# Patient Record
Sex: MALE | Race: WHITE | HISPANIC OR LATINO | Employment: UNEMPLOYED | URBAN - NONMETROPOLITAN AREA
[De-identification: names, ages, dates, MRNs, and addresses within clinical notes are randomized per-mention and may not be internally consistent; named-entity substitution may affect disease eponyms.]

---

## 2024-06-23 ENCOUNTER — HOSPITAL ENCOUNTER (EMERGENCY)
Facility: HOSPITAL | Age: 54
Discharge: HOME | End: 2024-06-23
Attending: EMERGENCY MEDICINE
Payer: COMMERCIAL

## 2024-06-23 ENCOUNTER — APPOINTMENT (OUTPATIENT)
Dept: CARDIOLOGY | Facility: HOSPITAL | Age: 54
End: 2024-06-23
Payer: COMMERCIAL

## 2024-06-23 VITALS
HEIGHT: 67 IN | OXYGEN SATURATION: 96 % | SYSTOLIC BLOOD PRESSURE: 133 MMHG | WEIGHT: 175 LBS | RESPIRATION RATE: 18 BRPM | HEART RATE: 119 BPM | BODY MASS INDEX: 27.47 KG/M2 | TEMPERATURE: 97.3 F | DIASTOLIC BLOOD PRESSURE: 80 MMHG

## 2024-06-23 DIAGNOSIS — Z13.9 ENCOUNTER FOR MEDICAL SCREENING EXAMINATION: ICD-10-CM

## 2024-06-23 DIAGNOSIS — F41.9 ANXIOUSNESS: ICD-10-CM

## 2024-06-23 DIAGNOSIS — F15.10 METHAMPHETAMINE ABUSE (MULTI): Primary | ICD-10-CM

## 2024-06-23 DIAGNOSIS — G47.00 INSOMNIA, UNSPECIFIED TYPE: ICD-10-CM

## 2024-06-23 PROCEDURE — 99283 EMERGENCY DEPT VISIT LOW MDM: CPT

## 2024-06-23 PROCEDURE — 93005 ELECTROCARDIOGRAM TRACING: CPT

## 2024-06-23 RX ORDER — HYDROXYZINE HYDROCHLORIDE 25 MG/1
25 TABLET, FILM COATED ORAL EVERY 6 HOURS PRN
Qty: 20 TABLET | Refills: 0 | Status: SHIPPED | OUTPATIENT
Start: 2024-06-23

## 2024-06-23 SDOH — HEALTH STABILITY: MENTAL HEALTH: BEHAVIORS/MOOD: ANXIOUS

## 2024-06-23 ASSESSMENT — PAIN DESCRIPTION - LOCATION: LOCATION: CHEST

## 2024-06-23 ASSESSMENT — PAIN - FUNCTIONAL ASSESSMENT
PAIN_FUNCTIONAL_ASSESSMENT: 0-10
PAIN_FUNCTIONAL_ASSESSMENT: 0-10

## 2024-06-23 ASSESSMENT — PAIN SCALES - GENERAL
PAINLEVEL_OUTOF10: 0 - NO PAIN
PAINLEVEL_OUTOF10: 7

## 2024-06-23 ASSESSMENT — PAIN DESCRIPTION - PROGRESSION: CLINICAL_PROGRESSION: NOT CHANGED

## 2024-06-23 ASSESSMENT — PAIN DESCRIPTION - DESCRIPTORS: DESCRIPTORS: ACHING

## 2024-06-23 ASSESSMENT — PAIN DESCRIPTION - PAIN TYPE: TYPE: ACUTE PAIN

## 2024-06-23 NOTE — ED PROVIDER NOTES
"HPI   Chief Complaint   Patient presents with    Addiction Problem     Ongoing symptoms since last taking meth 4 days ago. Shakiness, \"bumpy\" tongue, \"weird\" feeling in chest       53-year-old male with history of methamphetamine abuse presents for evaluation of methamphetamine abuse, insomnia, anxiousness, bumpiness in the tongue, \"weird\" feeling in the chest, and a sensation of needing oxygen.  Patient states \"I just need to get some oxygen.  Can you please give me oxygen?\"  Patient states that he used methamphetamine 4 days ago.  He states he has had a longstanding issue with methamphetamine.  He states he has been awake most of the time for the past 4 days.  He states that he wants to lay down and sleep.  He denies homicidal or suicidal ideation.  Denies hallucinations.  He is staying with a friend within walking distance name Jag.  He feels safe at home.  He is asking for something to help with his anxiousness and insomnia.  He is asking for resources for treatment for methamphetamine abuse.                            No data recorded                   Patient History   History reviewed. No pertinent past medical history.  History reviewed. No pertinent surgical history.  No family history on file.  Social History     Tobacco Use    Smoking status: Unknown    Smokeless tobacco: Not on file   Substance Use Topics    Alcohol use: Not on file    Drug use: Yes     Types: Methamphetamines     Comment: last use 4 days ago       Physical Exam   ED Triage Vitals [06/23/24 0819]   Temperature Heart Rate Respirations BP   36.3 °C (97.3 °F) (!) 119 18 133/80      SpO2 Temp Source Heart Rate Source Patient Position   96 % Temporal -- --      BP Location FiO2 (%)     -- --       Physical Exam  Vitals and nursing note reviewed.   Constitutional:       General: He is not in acute distress.     Appearance: He is well-developed.      Comments: Unkempt.   HENT:      Head: Normocephalic and atraumatic.   Eyes:      " Conjunctiva/sclera: Conjunctivae normal.   Cardiovascular:      Rate and Rhythm: Normal rate and regular rhythm.      Heart sounds: No murmur heard.  Pulmonary:      Effort: Pulmonary effort is normal. No respiratory distress.      Breath sounds: Normal breath sounds.   Abdominal:      Palpations: Abdomen is soft.      Tenderness: There is no abdominal tenderness.   Musculoskeletal:         General: No swelling.      Cervical back: Neck supple.   Skin:     General: Skin is warm and dry.      Capillary Refill: Capillary refill takes less than 2 seconds.   Neurological:      General: No focal deficit present.      Mental Status: He is alert and oriented to person, place, and time.      Cranial Nerves: No cranial nerve deficit.      Sensory: No sensory deficit.      Motor: No weakness.      Coordination: Coordination normal.   Psychiatric:         Attention and Perception: He does not perceive auditory or visual hallucinations.         Mood and Affect: Mood is anxious.         Speech: Speech normal.         Thought Content: Thought content does not include homicidal or suicidal ideation.         Judgment: Judgment normal.         ED Course & MDM   ED Course as of 06/23/24 0841   Sun Jun 23, 2024   0834 ECG 12 lead  EKG shows sinus tachycardia with right axis deviation.  Nonspecific ST-T changes.  No acute injury pattern.  No prior EKGs available for comparison. [BT]      ED Course User Index  [BT] Akira Mancilla DO         Diagnoses as of 06/23/24 0841   Methamphetamine abuse (Multi)   Encounter for medical screening examination   Anxiousness   Insomnia, unspecified type       Medical Decision Making    53-year-old male presents with methamphetamine abuse, anxiousness, insomnia.  He was complaining of some vague weird sensation in his chest without chest pain.  He also wishes to have enough oxygen.    His EKG shows sinus tachycardia with right axis deviation.  No acute injury pattern.  Doubt acute coronary  syndrome.    He is also complaining of some numbness in his tongue, likely secondary to smoking methamphetamine.    He is also complaining of insomnia and anxiousness, likely secondary to abusing methamphetamine.    Medical screening exam performed.  No emergency medical condition identified at this time.    He was referred to [facility] for outpatient treatment resources for methamphetamine abuse.  He was given a prescription for Atarax for anxiety and insomnia.    He was encouraged to return in 12 hours or sooner with any concerns.  He is agreeable with this plan and verbalized understanding.  Discharged home.    Procedure  Procedures     Akira Mancilla,   06/23/24 0803

## 2024-06-23 NOTE — ED TRIAGE NOTES
"Pt to ED c/o adverse reaction from using meth 4 days ago. Pt states he used approximately 4 days ago. Pt states he has pain in his tongue and chest, 8/10. Pt states it is a \"weird\" feeling. Pt also complains of shakiness. Pt is concerned that symptoms are ongoing 4 days after using.  "

## 2024-06-25 LAB
ATRIAL RATE: 118 BPM
P AXIS: 82 DEGREES
P OFFSET: 201 MS
P ONSET: 150 MS
PR INTERVAL: 130 MS
Q ONSET: 215 MS
QRS COUNT: 19 BEATS
QRS DURATION: 82 MS
QT INTERVAL: 362 MS
QTC CALCULATION(BAZETT): 507 MS
QTC FREDERICIA: 453 MS
R AXIS: 96 DEGREES
T AXIS: 49 DEGREES
T OFFSET: 396 MS
VENTRICULAR RATE: 118 BPM

## 2025-04-09 ENCOUNTER — HOSPITAL ENCOUNTER (EMERGENCY)
Facility: HOSPITAL | Age: 55
Discharge: HOME | End: 2025-04-09
Attending: EMERGENCY MEDICINE
Payer: COMMERCIAL

## 2025-04-09 VITALS
RESPIRATION RATE: 19 BRPM | WEIGHT: 210 LBS | DIASTOLIC BLOOD PRESSURE: 64 MMHG | HEART RATE: 95 BPM | TEMPERATURE: 97.7 F | HEIGHT: 67 IN | BODY MASS INDEX: 32.96 KG/M2 | SYSTOLIC BLOOD PRESSURE: 148 MMHG | OXYGEN SATURATION: 98 %

## 2025-04-09 DIAGNOSIS — M54.31 SCIATICA OF RIGHT SIDE: Primary | ICD-10-CM

## 2025-04-09 PROCEDURE — 2500000001 HC RX 250 WO HCPCS SELF ADMINISTERED DRUGS (ALT 637 FOR MEDICARE OP): Mod: SE

## 2025-04-09 PROCEDURE — 99283 EMERGENCY DEPT VISIT LOW MDM: CPT | Performed by: EMERGENCY MEDICINE

## 2025-04-09 RX ORDER — IBUPROFEN 600 MG/1
600 TABLET ORAL EVERY 6 HOURS PRN
Qty: 28 TABLET | Refills: 0 | Status: SHIPPED | OUTPATIENT
Start: 2025-04-09 | End: 2025-04-16

## 2025-04-09 RX ORDER — PREDNISONE 10 MG/1
20 TABLET ORAL DAILY
Qty: 14 TABLET | Refills: 0 | Status: SHIPPED | OUTPATIENT
Start: 2025-04-09 | End: 2025-04-16

## 2025-04-09 RX ORDER — IBUPROFEN 600 MG/1
600 TABLET ORAL ONCE
Status: COMPLETED | OUTPATIENT
Start: 2025-04-09 | End: 2025-04-09

## 2025-04-09 RX ORDER — IBUPROFEN 600 MG/1
TABLET ORAL
Status: COMPLETED
Start: 2025-04-09 | End: 2025-04-09

## 2025-04-09 RX ADMIN — IBUPROFEN 600 MG: 600 TABLET ORAL at 15:24

## 2025-04-09 ASSESSMENT — PAIN - FUNCTIONAL ASSESSMENT
PAIN_FUNCTIONAL_ASSESSMENT: 0-10
PAIN_FUNCTIONAL_ASSESSMENT: 0-10

## 2025-04-09 ASSESSMENT — COLUMBIA-SUICIDE SEVERITY RATING SCALE - C-SSRS
6. HAVE YOU EVER DONE ANYTHING, STARTED TO DO ANYTHING, OR PREPARED TO DO ANYTHING TO END YOUR LIFE?: NO
1. IN THE PAST MONTH, HAVE YOU WISHED YOU WERE DEAD OR WISHED YOU COULD GO TO SLEEP AND NOT WAKE UP?: NO
2. HAVE YOU ACTUALLY HAD ANY THOUGHTS OF KILLING YOURSELF?: NO

## 2025-04-09 ASSESSMENT — PAIN SCALES - GENERAL
PAINLEVEL_OUTOF10: 5 - MODERATE PAIN
PAINLEVEL_OUTOF10: 9

## 2025-04-09 ASSESSMENT — PAIN DESCRIPTION - LOCATION: LOCATION: COCCYX

## 2025-04-09 ASSESSMENT — PAIN DESCRIPTION - PAIN TYPE: TYPE: ACUTE PAIN

## 2025-04-09 ASSESSMENT — PAIN DESCRIPTION - DESCRIPTORS: DESCRIPTORS: DISCOMFORT

## 2025-04-09 NOTE — ED PROVIDER NOTES
[facility]   ED  Provider Note  4/9/2025  2:44 PM  AC10/AC10      Chief Complaint   Patient presents with    Tailbone Pain        History of Present Illness:   Ananth Dumont is a 54 y.o. male presenting to the ED for right sciatic notch pain beginning 2 weeks ago.  The complaint has been intermittent, moderate in severity, and worsened by sitting.  Patient denies fever or chills.  He has no flank pain he has no dysuria urgency or frequency.  He has no history of trauma or overuse.  The pain is localized to his right sciatic notch and worsens with activity.  He denies history of abscess or previous infections in the area.  He denies any direct trauma or overuse.      Review of Systems:   Pertinent positives and review of systems as noted above.  Remaining 10 review of systems is negative or noncontributory to today's episode of care.  Review of Systems       --------------------------------------------- PAST HISTORY ---------------------------------------------  Past Medical History: History reviewed. No pertinent past medical history.     Past Surgical History: History reviewed. No pertinent surgical history.     Social History:   Social History     Social History Narrative    Not on file        Family History: family history is not on file. Unless otherwise noted, family history is non contributory    Patient's Medications   New Prescriptions    IBUPROFEN 600 MG TABLET    Take 1 tablet (600 mg) by mouth every 6 hours if needed for mild pain (1 - 3) for up to 7 days.    PREDNISONE (DELTASONE) 10 MG TABLET    Take 2 tablets (20 mg) by mouth once daily for 7 days.   Previous Medications    HYDROXYZINE HCL (ATARAX) 25 MG TABLET    Take 1 tablet (25 mg) by mouth every 6 hours if needed for anxiety (insomnia).   Modified Medications    No medications on file   Discontinued Medications    No medications on file      The patient’s home medications have been reviewed.    Allergies: Patient has no known  "allergies.    -------------------------------------------------- RESULTS -------------------------------------------------  All laboratory and radiology results have been personally reviewed by myself   LABS:  Labs Reviewed - No data to display      RADIOLOGY:  Interpreted by Radiologist.  No orders to display       No results found for this or any previous visit (from the past 4464 hours).  ------------------------- NURSING NOTES AND VITALS REVIEWED ---------------------------   The nursing notes within the ED encounter and vital signs as below have been reviewed.   /74   Pulse (!) 102   Temp 36.6 °C (97.8 °F)   Resp 18   Ht 1.702 m (5' 7\")   Wt 95.3 kg (210 lb)   SpO2 98%   BMI 32.89 kg/m²   Oxygen Saturation Interpretation: Normal      ---------------------------------------------------PHYSICAL EXAM--------------------------------------  Physical Exam   Constitutional/General: Alert,  well appearing, non toxic in NAD  Head: Normocephalic and atraumatic  Eyes: PERRL, EOMI, conjunctiva normal, sclera non icteric  Mouth: Oropharynx clear, handling secretions, no trismus, no asymmetry of the posterior oropharynx or uvular edema  Neck: Supple, full ROM, non tender to palpation in the midline, no stridor, no crepitus, no meningeal signs  Respiratory: Lungs clear to auscultation bilaterally, no wheezes, rales, or rhonchi. Not in respiratory distress  Cardiovascular:  Regular rate. Regular rhythm. No murmurs, gallops, or rubs. 2+ distal pulses  Chest: No chest wall tenderness  GI:  Abdomen Soft, Non tender, Non distended.  +BS. No organomegaly, no palpable masses,  No rebound, guarding, or rigidity.   Musculoskeletal: Moves all extremities x 4. Warm and well perfused, no clubbing, cyanosis, or edema. Capillary refill <3 seconds, there is right second chest palpation.  No midline tenderness to palpation.  No evidence of trauma.  Integument: skin warm and dry. No rashes.   Lymphatic: no lymphadenopathy " noted  Neurologic: No focal deficits, symmetric strength 5/5 in the upper and lower extremities bilaterally  Psychiatric: Normal Affect    Procedures    ------------------------------ ED COURSE/MEDICAL DECISION MAKING----------------------  Diagnoses as of 04/09/25 1519   Sciatica of right side      Patient has right-sided sciatica to palpation of the evidence of localized infection.  He denies any trauma or overuse.  To be treated with ibuprofen and prednisone.  He is follow-up his primary care doctor in 1 to 2 weeks for recheck.  I have asked return for worsening symptoms and concerns.      Medical Decision Making:   Discharge to home    Diagnoses as of 04/09/25 1519   Sciatica of right side      Counseling:   The emergency provider has spoken with the patient and discussed today’s results, in addition to providing specific details for the plan of care and counseling regarding the diagnosis and prognosis.  Questions are answered at this time and they are agreeable with the plan.      --------------------------------- IMPRESSION AND DISPOSITION ---------------------------------        IMPRESSION  1. Sciatica of right side        DISPOSITION  Disposition: Discharge to home  Patient condition is fair      Billing Provider Critical Care Time: 0 minutes     Lui Summers MD  04/09/25 3257

## 2025-04-09 NOTE — DISCHARGE INSTRUCTIONS
Ibuprofen and prednisone as prescribed.    Follow-up with your primary care doctor in 1 week if not better.    Return for worsening symptoms or concerns.